# Patient Record
Sex: MALE | Race: OTHER | HISPANIC OR LATINO | URBAN - METROPOLITAN AREA
[De-identification: names, ages, dates, MRNs, and addresses within clinical notes are randomized per-mention and may not be internally consistent; named-entity substitution may affect disease eponyms.]

---

## 2018-12-15 ENCOUNTER — EMERGENCY (EMERGENCY)
Facility: HOSPITAL | Age: 23
LOS: 1 days | Discharge: HOME | End: 2018-12-15
Attending: EMERGENCY MEDICINE | Admitting: EMERGENCY MEDICINE
Payer: COMMERCIAL

## 2018-12-15 VITALS
SYSTOLIC BLOOD PRESSURE: 118 MMHG | HEART RATE: 118 BPM | DIASTOLIC BLOOD PRESSURE: 60 MMHG | RESPIRATION RATE: 20 BRPM | OXYGEN SATURATION: 97 %

## 2018-12-15 PROCEDURE — 99283 EMERGENCY DEPT VISIT LOW MDM: CPT | Mod: 25

## 2018-12-15 NOTE — ED ADULT TRIAGE NOTE - CHIEF COMPLAINT QUOTE
pt brought by mom to triage c/o N/V/D x 7 hours associated with abdm pain, states went to drive in movie theater and ate chicken fingers, has been throwing up since, yellow emesis noted on presentation

## 2018-12-16 ENCOUNTER — EMERGENCY (EMERGENCY)
Facility: HOSPITAL | Age: 23
LOS: 1 days | Discharge: HOME | End: 2018-12-16
Attending: EMERGENCY MEDICINE | Admitting: EMERGENCY MEDICINE
Payer: COMMERCIAL

## 2018-12-16 VITALS
RESPIRATION RATE: 18 BRPM | SYSTOLIC BLOOD PRESSURE: 137 MMHG | TEMPERATURE: 99 F | OXYGEN SATURATION: 100 % | DIASTOLIC BLOOD PRESSURE: 77 MMHG | HEART RATE: 96 BPM

## 2018-12-16 VITALS
HEART RATE: 80 BPM | DIASTOLIC BLOOD PRESSURE: 70 MMHG | SYSTOLIC BLOOD PRESSURE: 124 MMHG | OXYGEN SATURATION: 98 % | RESPIRATION RATE: 18 BRPM

## 2018-12-16 VITALS
HEART RATE: 101 BPM | SYSTOLIC BLOOD PRESSURE: 150 MMHG | OXYGEN SATURATION: 100 % | TEMPERATURE: 98 F | RESPIRATION RATE: 18 BRPM | DIASTOLIC BLOOD PRESSURE: 74 MMHG

## 2018-12-16 LAB
ALBUMIN SERPL ELPH-MCNC: 4 G/DL — SIGNIFICANT CHANGE UP (ref 3.3–5)
ALBUMIN SERPL ELPH-MCNC: 4.6 G/DL — SIGNIFICANT CHANGE UP (ref 3.3–5)
ALP SERPL-CCNC: 61 U/L — SIGNIFICANT CHANGE UP (ref 40–120)
ALP SERPL-CCNC: 75 U/L — SIGNIFICANT CHANGE UP (ref 40–120)
ALT FLD-CCNC: 18 U/L — SIGNIFICANT CHANGE UP (ref 4–41)
ALT FLD-CCNC: 21 U/L — SIGNIFICANT CHANGE UP (ref 4–41)
APPEARANCE UR: CLEAR — SIGNIFICANT CHANGE UP
AST SERPL-CCNC: 18 U/L — SIGNIFICANT CHANGE UP (ref 4–40)
AST SERPL-CCNC: 19 U/L — SIGNIFICANT CHANGE UP (ref 4–40)
BACTERIA # UR AUTO: NEGATIVE — SIGNIFICANT CHANGE UP
BASE EXCESS BLDV CALC-SCNC: 2.3 MMOL/L — SIGNIFICANT CHANGE UP
BASOPHILS # BLD AUTO: 0.02 K/UL — SIGNIFICANT CHANGE UP (ref 0–0.2)
BASOPHILS # BLD AUTO: 0.03 K/UL — SIGNIFICANT CHANGE UP (ref 0–0.2)
BASOPHILS NFR BLD AUTO: 0.2 % — SIGNIFICANT CHANGE UP (ref 0–2)
BASOPHILS NFR BLD AUTO: 0.4 % — SIGNIFICANT CHANGE UP (ref 0–2)
BASOPHILS NFR SPEC: 0.9 % — SIGNIFICANT CHANGE UP (ref 0–2)
BASOPHILS NFR SPEC: 0.9 % — SIGNIFICANT CHANGE UP (ref 0–2)
BILIRUB SERPL-MCNC: 0.6 MG/DL — SIGNIFICANT CHANGE UP (ref 0.2–1.2)
BILIRUB SERPL-MCNC: 0.8 MG/DL — SIGNIFICANT CHANGE UP (ref 0.2–1.2)
BILIRUB UR-MCNC: NEGATIVE — SIGNIFICANT CHANGE UP
BLASTS # FLD: 0 % — SIGNIFICANT CHANGE UP (ref 0–0)
BLOOD GAS VENOUS - CREATININE: 1.01 MG/DL — SIGNIFICANT CHANGE UP (ref 0.5–1.3)
BLOOD UR QL VISUAL: NEGATIVE — SIGNIFICANT CHANGE UP
BUN SERPL-MCNC: 18 MG/DL — SIGNIFICANT CHANGE UP (ref 7–23)
BUN SERPL-MCNC: 19 MG/DL — SIGNIFICANT CHANGE UP (ref 7–23)
CALCIUM SERPL-MCNC: 8.9 MG/DL — SIGNIFICANT CHANGE UP (ref 8.4–10.5)
CALCIUM SERPL-MCNC: 9.7 MG/DL — SIGNIFICANT CHANGE UP (ref 8.4–10.5)
CHLORIDE BLDV-SCNC: 107 MMOL/L — SIGNIFICANT CHANGE UP (ref 96–108)
CHLORIDE SERPL-SCNC: 101 MMOL/L — SIGNIFICANT CHANGE UP (ref 98–107)
CHLORIDE SERPL-SCNC: 102 MMOL/L — SIGNIFICANT CHANGE UP (ref 98–107)
CO2 SERPL-SCNC: 22 MMOL/L — SIGNIFICANT CHANGE UP (ref 22–31)
CO2 SERPL-SCNC: 26 MMOL/L — SIGNIFICANT CHANGE UP (ref 22–31)
COLOR SPEC: YELLOW — SIGNIFICANT CHANGE UP
CREAT SERPL-MCNC: 1.23 MG/DL — SIGNIFICANT CHANGE UP (ref 0.5–1.3)
CREAT SERPL-MCNC: 1.28 MG/DL — SIGNIFICANT CHANGE UP (ref 0.5–1.3)
EOSINOPHIL # BLD AUTO: 0.05 K/UL — SIGNIFICANT CHANGE UP (ref 0–0.5)
EOSINOPHIL # BLD AUTO: 0.07 K/UL — SIGNIFICANT CHANGE UP (ref 0–0.5)
EOSINOPHIL NFR BLD AUTO: 0.5 % — SIGNIFICANT CHANGE UP (ref 0–6)
EOSINOPHIL NFR BLD AUTO: 1 % — SIGNIFICANT CHANGE UP (ref 0–6)
EOSINOPHIL NFR FLD: 0.9 % — SIGNIFICANT CHANGE UP (ref 0–6)
EOSINOPHIL NFR FLD: 4.4 % — SIGNIFICANT CHANGE UP (ref 0–6)
GAS PNL BLDV: 133 MMOL/L — LOW (ref 136–146)
GIANT PLATELETS BLD QL SMEAR: PRESENT — SIGNIFICANT CHANGE UP
GLUCOSE BLDV-MCNC: 110 — HIGH (ref 70–99)
GLUCOSE SERPL-MCNC: 100 MG/DL — HIGH (ref 70–99)
GLUCOSE SERPL-MCNC: 117 MG/DL — HIGH (ref 70–99)
GLUCOSE UR-MCNC: NEGATIVE — SIGNIFICANT CHANGE UP
HCO3 BLDV-SCNC: 25 MMOL/L — SIGNIFICANT CHANGE UP (ref 20–27)
HCT VFR BLD CALC: 46 % — SIGNIFICANT CHANGE UP (ref 39–50)
HCT VFR BLD CALC: 47.1 % — SIGNIFICANT CHANGE UP (ref 39–50)
HCT VFR BLDV CALC: 45.9 % — SIGNIFICANT CHANGE UP (ref 39–51)
HGB BLD-MCNC: 14.9 G/DL — SIGNIFICANT CHANGE UP (ref 13–17)
HGB BLD-MCNC: 15.4 G/DL — SIGNIFICANT CHANGE UP (ref 13–17)
HGB BLDV-MCNC: 15 G/DL — SIGNIFICANT CHANGE UP (ref 13–17)
HYALINE CASTS # UR AUTO: NEGATIVE — SIGNIFICANT CHANGE UP
IMM GRANULOCYTES # BLD AUTO: 0.01 # — SIGNIFICANT CHANGE UP
IMM GRANULOCYTES # BLD AUTO: 0.04 # — SIGNIFICANT CHANGE UP
IMM GRANULOCYTES NFR BLD AUTO: 0.2 % — SIGNIFICANT CHANGE UP (ref 0–1.5)
IMM GRANULOCYTES NFR BLD AUTO: 0.3 % — SIGNIFICANT CHANGE UP (ref 0–1.5)
KETONES UR-MCNC: NEGATIVE — SIGNIFICANT CHANGE UP
LACTATE BLDV-MCNC: 1.8 MMOL/L — SIGNIFICANT CHANGE UP (ref 0.5–2)
LEUKOCYTE ESTERASE UR-ACNC: NEGATIVE — SIGNIFICANT CHANGE UP
LYMPHOCYTES # BLD AUTO: 0.39 K/UL — LOW (ref 1–3.3)
LYMPHOCYTES # BLD AUTO: 0.66 K/UL — LOW (ref 1–3.3)
LYMPHOCYTES # BLD AUTO: 13.7 % — SIGNIFICANT CHANGE UP (ref 13–44)
LYMPHOCYTES # BLD AUTO: 2.9 % — LOW (ref 13–44)
LYMPHOCYTES NFR SPEC AUTO: 0.9 % — LOW (ref 13–44)
LYMPHOCYTES NFR SPEC AUTO: 14 % — SIGNIFICANT CHANGE UP (ref 13–44)
MCHC RBC-ENTMCNC: 26.3 PG — LOW (ref 27–34)
MCHC RBC-ENTMCNC: 26.3 PG — LOW (ref 27–34)
MCHC RBC-ENTMCNC: 32.4 % — SIGNIFICANT CHANGE UP (ref 32–36)
MCHC RBC-ENTMCNC: 32.7 % — SIGNIFICANT CHANGE UP (ref 32–36)
MCV RBC AUTO: 80.4 FL — SIGNIFICANT CHANGE UP (ref 80–100)
MCV RBC AUTO: 81.1 FL — SIGNIFICANT CHANGE UP (ref 80–100)
METAMYELOCYTES # FLD: 0.9 % — SIGNIFICANT CHANGE UP (ref 0–1)
MICROCYTES BLD QL: SIGNIFICANT CHANGE UP
MONOCYTES # BLD AUTO: 0.52 K/UL — SIGNIFICANT CHANGE UP (ref 0–0.9)
MONOCYTES # BLD AUTO: 0.86 K/UL — SIGNIFICANT CHANGE UP (ref 0–0.9)
MONOCYTES NFR BLD AUTO: 10.8 % — SIGNIFICANT CHANGE UP (ref 2–14)
MONOCYTES NFR BLD AUTO: 6.4 % — SIGNIFICANT CHANGE UP (ref 2–14)
MONOCYTES NFR BLD: 12.3 % — HIGH (ref 2–9)
MONOCYTES NFR BLD: 6.9 % — SIGNIFICANT CHANGE UP (ref 2–9)
MORPHOLOGY BLD-IMP: NORMAL — SIGNIFICANT CHANGE UP
MYELOCYTES NFR BLD: 0 % — SIGNIFICANT CHANGE UP (ref 0–0)
NEUTROPHIL AB SER-ACNC: 51.8 % — SIGNIFICANT CHANGE UP (ref 43–77)
NEUTROPHIL AB SER-ACNC: 75.6 % — SIGNIFICANT CHANGE UP (ref 43–77)
NEUTROPHILS # BLD AUTO: 11.99 K/UL — HIGH (ref 1.8–7.4)
NEUTROPHILS # BLD AUTO: 3.55 K/UL — SIGNIFICANT CHANGE UP (ref 1.8–7.4)
NEUTROPHILS NFR BLD AUTO: 73.9 % — SIGNIFICANT CHANGE UP (ref 43–77)
NEUTROPHILS NFR BLD AUTO: 89.7 % — HIGH (ref 43–77)
NEUTS BAND # BLD: 10.5 % — HIGH (ref 0–6)
NEUTS BAND # BLD: 13 % — HIGH (ref 0–6)
NITRITE UR-MCNC: NEGATIVE — SIGNIFICANT CHANGE UP
NRBC # FLD: 0 — SIGNIFICANT CHANGE UP
NRBC # FLD: 0 — SIGNIFICANT CHANGE UP
OTHER - HEMATOLOGY %: 0 — SIGNIFICANT CHANGE UP
OVALOCYTES BLD QL SMEAR: SLIGHT — SIGNIFICANT CHANGE UP
PCO2 BLDV: 48 MMHG — SIGNIFICANT CHANGE UP (ref 41–51)
PH BLDV: 7.37 PH — SIGNIFICANT CHANGE UP (ref 7.32–7.43)
PH UR: 6 — SIGNIFICANT CHANGE UP (ref 5–8)
PLATELET # BLD AUTO: 214 K/UL — SIGNIFICANT CHANGE UP (ref 150–400)
PLATELET # BLD AUTO: 227 K/UL — SIGNIFICANT CHANGE UP (ref 150–400)
PLATELET COUNT - ESTIMATE: NORMAL — SIGNIFICANT CHANGE UP
PMV BLD: 11.7 FL — SIGNIFICANT CHANGE UP (ref 7–13)
PMV BLD: 11.7 FL — SIGNIFICANT CHANGE UP (ref 7–13)
PO2 BLDV: 37 MMHG — SIGNIFICANT CHANGE UP (ref 35–40)
POTASSIUM BLDV-SCNC: 3.5 MMOL/L — SIGNIFICANT CHANGE UP (ref 3.4–4.5)
POTASSIUM SERPL-MCNC: 3.8 MMOL/L — SIGNIFICANT CHANGE UP (ref 3.5–5.3)
POTASSIUM SERPL-MCNC: 4.5 MMOL/L — SIGNIFICANT CHANGE UP (ref 3.5–5.3)
POTASSIUM SERPL-SCNC: 3.8 MMOL/L — SIGNIFICANT CHANGE UP (ref 3.5–5.3)
POTASSIUM SERPL-SCNC: 4.5 MMOL/L — SIGNIFICANT CHANGE UP (ref 3.5–5.3)
PROMYELOCYTES # FLD: 0 % — SIGNIFICANT CHANGE UP (ref 0–0)
PROT SERPL-MCNC: 7.5 G/DL — SIGNIFICANT CHANGE UP (ref 6–8.3)
PROT SERPL-MCNC: 8 G/DL — SIGNIFICANT CHANGE UP (ref 6–8.3)
PROT UR-MCNC: 20 — SIGNIFICANT CHANGE UP
RBC # BLD: 5.67 M/UL — SIGNIFICANT CHANGE UP (ref 4.2–5.8)
RBC # BLD: 5.86 M/UL — HIGH (ref 4.2–5.8)
RBC # FLD: 12.3 % — SIGNIFICANT CHANGE UP (ref 10.3–14.5)
RBC # FLD: 12.3 % — SIGNIFICANT CHANGE UP (ref 10.3–14.5)
RBC CASTS # UR COMP ASSIST: SIGNIFICANT CHANGE UP (ref 0–?)
SAO2 % BLDV: 66.2 % — SIGNIFICANT CHANGE UP (ref 60–85)
SMUDGE CELLS # BLD: PRESENT — SIGNIFICANT CHANGE UP
SODIUM SERPL-SCNC: 138 MMOL/L — SIGNIFICANT CHANGE UP (ref 135–145)
SODIUM SERPL-SCNC: 139 MMOL/L — SIGNIFICANT CHANGE UP (ref 135–145)
SP GR SPEC: 1.03 — SIGNIFICANT CHANGE UP (ref 1–1.04)
SQUAMOUS # UR AUTO: SIGNIFICANT CHANGE UP
UROBILINOGEN FLD QL: NORMAL — SIGNIFICANT CHANGE UP
VARIANT LYMPHS # BLD: 0.9 % — SIGNIFICANT CHANGE UP
VARIANT LYMPHS # FLD: 6.1 % — SIGNIFICANT CHANGE UP
WBC # BLD: 13.38 K/UL — HIGH (ref 3.8–10.5)
WBC # BLD: 4.81 K/UL — SIGNIFICANT CHANGE UP (ref 3.8–10.5)
WBC # FLD AUTO: 13.38 K/UL — HIGH (ref 3.8–10.5)
WBC # FLD AUTO: 4.81 K/UL — SIGNIFICANT CHANGE UP (ref 3.8–10.5)
WBC UR QL: SIGNIFICANT CHANGE UP (ref 0–?)

## 2018-12-16 PROCEDURE — 71046 X-RAY EXAM CHEST 2 VIEWS: CPT | Mod: 26

## 2018-12-16 PROCEDURE — 99283 EMERGENCY DEPT VISIT LOW MDM: CPT

## 2018-12-16 RX ORDER — FAMOTIDINE 10 MG/ML
20 INJECTION INTRAVENOUS DAILY
Qty: 0 | Refills: 0 | Status: DISCONTINUED | OUTPATIENT
Start: 2018-12-16 | End: 2018-12-19

## 2018-12-16 RX ORDER — SODIUM CHLORIDE 9 MG/ML
1000 INJECTION INTRAMUSCULAR; INTRAVENOUS; SUBCUTANEOUS ONCE
Qty: 0 | Refills: 0 | Status: COMPLETED | OUTPATIENT
Start: 2018-12-16 | End: 2018-12-16

## 2018-12-16 RX ORDER — ONDANSETRON 8 MG/1
4 TABLET, FILM COATED ORAL ONCE
Qty: 0 | Refills: 0 | Status: COMPLETED | OUTPATIENT
Start: 2018-12-16 | End: 2018-12-16

## 2018-12-16 RX ADMIN — ONDANSETRON 4 MILLIGRAM(S): 8 TABLET, FILM COATED ORAL at 00:27

## 2018-12-16 RX ADMIN — Medication 30 MILLILITER(S): at 02:50

## 2018-12-16 RX ADMIN — FAMOTIDINE 20 MILLIGRAM(S): 10 INJECTION INTRAVENOUS at 02:50

## 2018-12-16 RX ADMIN — SODIUM CHLORIDE 1000 MILLILITER(S): 9 INJECTION INTRAMUSCULAR; INTRAVENOUS; SUBCUTANEOUS at 14:31

## 2018-12-16 RX ADMIN — SODIUM CHLORIDE 1000 MILLILITER(S): 9 INJECTION INTRAMUSCULAR; INTRAVENOUS; SUBCUTANEOUS at 00:27

## 2018-12-16 NOTE — ED ADULT NURSE NOTE - OBJECTIVE STATEMENT
Zhou RN: Patient received in Trauma A c/o N/V/D and abdominal pain s/p eating chicken fingers today. Patient A&OX3, reports Dad also ate the same food and is feeling nauseas. Patient denies any PMHx or blood in stool/emesis. VS as noted. Report given to primary RN Chen. Will monitor.

## 2018-12-16 NOTE — ED PROVIDER NOTE - OBJECTIVE STATEMENT
24 y/o healthy M with 1 day of N/V/D.  Pt reports multiple episodes (>7) of nbnb vomiting and nonbloody soft diarrhea.  Pt reports he was at a dine-in movie theater with his family this afternoon prior to symptom onset.  He and his father had chicken fingers, and both have similar sxs.  No fever, cp, sob, back pain, urinary sxs.  No recent travel or abx use/hospitalizations.  Pt reports "tightening" feeling in his abdomen, which worsens when vomiting, but no other pain.

## 2018-12-16 NOTE — ED PROVIDER NOTE - CARE PLAN
Principal Discharge DX:	Gastroenteritis  Assessment and plan of treatment:	Advance activity as tolerated.  Continue all previously prescribed medications as directed unless otherwise instructed.  Take Zofran 4 mg ODT every 8 hours as needed for nausea and vomiting. Follow up with your primary care physician in 48-72 hours- bring copies of your results.  Return to the ER for worsening or persistent symptoms, including but not limited to worsening/persistent fevers, vomiting, diarrhea, bloody stools, abdominal pain, and/or ANY NEW OR CONCERNING SYMPTOMS. If you have issues obtaining follow up, please call: [phone] to obtain a doctor or specialist who takes your insurance in your area.  You may call [phone] to make an appointment with the internal medicine clinic.

## 2018-12-16 NOTE — ED PROVIDER NOTE - NSFOLLOWUPINSTRUCTIONS_ED_ALL_ED_FT
Drink plenty of fluids.  You can take over-the-counter stomach medication as needed for pain and nausea (Pepto Bismol, Pepcid, Maalox, etc.)  Take as directed.  Follow-up with your PMD.  Return to the emergency department with any new or worsening symptoms.

## 2018-12-16 NOTE — ED PROVIDER NOTE - PROGRESS NOTE DETAILS
SOFIA PATTON:  UA and CXR negative for acute pathology. Bandemia improved.  Pt very well appearing.  Pt tolerating PO.  Pt medically stable for discharge. Pt to follow up with PMD. Strict return precautions given.

## 2018-12-16 NOTE — ED POST DISCHARGE NOTE - DETAILS
Attempted to contact patient to return for repeat labs and evaluation.  No answer at home number and message left on cell phone. Attempted to contact patient, no answer at listed number. Info left for admin PA for call back.

## 2018-12-16 NOTE — ED PROVIDER NOTE - PLAN OF CARE
Advance activity as tolerated.  Continue all previously prescribed medications as directed unless otherwise instructed.  Take Zofran 4 mg ODT every 8 hours as needed for nausea and vomiting. Follow up with your primary care physician in 48-72 hours- bring copies of your results.  Return to the ER for worsening or persistent symptoms, including but not limited to worsening/persistent fevers, vomiting, diarrhea, bloody stools, abdominal pain, and/or ANY NEW OR CONCERNING SYMPTOMS. If you have issues obtaining follow up, please call: [phone] to obtain a doctor or specialist who takes your insurance in your area.  You may call [phone] to make an appointment with the internal medicine clinic.

## 2018-12-16 NOTE — ED ADULT NURSE NOTE - NSIMPLEMENTINTERV_GEN_ALL_ED
Implemented All Universal Safety Interventions:  [place] to call system. Call bell, personal items and telephone within reach. Instruct patient to call for assistance. Room bathroom lighting operational. Non-slip footwear when patient is off stretcher. Physically safe environment: no spills, clutter or unnecessary equipment. Stretcher in lowest position, wheels locked, appropriate side rails in place.

## 2018-12-16 NOTE — ED PROVIDER NOTE - MEDICAL DECISION MAKING DETAILS
pt here for follow up of abnormal lab  Pts sxs improved  still with elevated band (percent decreased )  WBC nl  overall feel acute illness has passed

## 2018-12-16 NOTE — ED PROVIDER NOTE - OBJECTIVE STATEMENT
pt recalled for elevated band count  seen yesterday for acute GI sxs  likely due to food bourne pathogen   Had fever  vomtiing watery diarrhea  occurred about 2 hrs after ingestion (relative had same food with similar though less severe sxs)    Pt reports  no further vomiting  diarrhea resolving (less frequent more formed stools)   no abd pain  cough or urinary sxs

## 2018-12-16 NOTE — ED ADULT TRIAGE NOTE - CHIEF COMPLAINT QUOTE
pt is a call back states I was seen here yesterday for n,v ,d I received phone call today telling me to return because I have infection in my blood/

## 2018-12-16 NOTE — ED PROVIDER NOTE - ATTENDING CONTRIBUTION TO CARE
[place]  pt recalled due to elevated band count drawn yesterday  Pt here yesterday with acute GI illness  Occurred about 2 hrs after eating out  (one relative also became sick after eating at the same place)  Pt denies frequent GI illnesses or recent travel  Yesterday  had recurrent vomiting  fever and watery diarrhea   Pt no longer nauseous, stools are more formed  denies fever or abd pain    exam  well appearing  no distress  clear lungs  abd nontender  pt  ambulatory  in ED w/o sxs    Imp  resolving acute GI illness ?staph by timing

## 2018-12-16 NOTE — ED PROVIDER NOTE - NSFOLLOWUPINSTRUCTIONS_ED_ALL_ED_FT
Advance activity as tolerated.  Continue all previously prescribed medications as directed unless otherwise instructed.  Take Zofran 4 mg ODT every 8 hours as needed for nausea and vomiting. Follow up with your primary care physician in 48-72 hours- bring copies of your results.  Return to the ER for worsening or persistent symptoms, including but not limited to worsening/persistent fevers, vomiting, diarrhea, bloody stools, abdominal pain, and/or ANY NEW OR CONCERNING SYMPTOMS. If you have issues obtaining follow up, please call: [phone] to obtain a doctor or specialist who takes your insurance in your area.  You may call [phone] to make an appointment with the internal medicine clinic.

## 2018-12-16 NOTE — ED PROVIDER NOTE - MEDICAL DECISION MAKING DETAILS
22 y/o with N/V/D x 1 day.  Nontoxic appearing, benign abd exam, but mildly dry on exam.  Will hydrate, check labs, antiemetics, reassess.  Likely viral age vs food poisoning given exposure and others with similar sxs.

## 2018-12-17 LAB
GI PCR PANEL, STOOL: SIGNIFICANT CHANGE UP
SPECIMEN SOURCE: SIGNIFICANT CHANGE UP

## 2018-12-17 NOTE — ED POST DISCHARGE NOTE - DETAILS
Patient informed of stool PCR results. His diarrhea, vomiting, and nausea have fully resolved. He was councelled to avoid food preparation for others for 48 hours and to wash hands carefully with soap and water after using restroom. Patient informed that alcohol-based disinfectants are insufficient to prevent transmission of this virus. He was told to return for any persistent symptoms, to follow up with his primary doctor, and to continue drinking lots of fluids. He agrees to all recommendation

## 2018-12-18 LAB
BACTERIA UR CULT: SIGNIFICANT CHANGE UP
SPECIMEN SOURCE: SIGNIFICANT CHANGE UP

## 2018-12-19 LAB — BACTERIA STL CULT: SIGNIFICANT CHANGE UP

## 2018-12-21 LAB
BACTERIA BLD CULT: SIGNIFICANT CHANGE UP
BACTERIA BLD CULT: SIGNIFICANT CHANGE UP

## 2021-06-13 ENCOUNTER — EMERGENCY (EMERGENCY)
Facility: HOSPITAL | Age: 26
LOS: 1 days | Discharge: HOME | End: 2021-06-13
Admitting: EMERGENCY MEDICINE
Payer: COMMERCIAL

## 2021-06-13 VITALS
DIASTOLIC BLOOD PRESSURE: 71 MMHG | RESPIRATION RATE: 16 BRPM | OXYGEN SATURATION: 100 % | TEMPERATURE: 98 F | HEART RATE: 61 BPM | SYSTOLIC BLOOD PRESSURE: 135 MMHG

## 2021-06-13 PROCEDURE — 10060 I&D ABSCESS SIMPLE/SINGLE: CPT

## 2021-06-13 PROCEDURE — 99283 EMERGENCY DEPT VISIT LOW MDM: CPT | Mod: 25

## 2021-06-13 NOTE — ED ADULT TRIAGE NOTE - CHIEF COMPLAINT QUOTE
pt has lump on side of face near neck, for past 3 days and its getting bigger/ no redness  noted no pain

## 2021-06-13 NOTE — ED PROVIDER NOTE - OBJECTIVE STATEMENT
patient is a 25 y/o M with no sig pmhx presenting with c/o mass to the left side of neck x 4 days. He states mass is painless, with no assoc fever, chills, sore throat. He denies injuries, trauma, shaving, sick contact prior to the onset of symptoms. He reports having a history of ance, some of which have developed into abscess.

## 2021-06-13 NOTE — ED PROVIDER NOTE - NSFOLLOWUPINSTRUCTIONS_ED_ALL_ED_FT
You were prescribed clindamycin. please take antibiotic three times a day for 7 days.   Abscess    An abscess is an infected area that contains a collection of pus and debris. It can occur in almost any part of the body and occurs when the tissue gets infection. Symptoms include a painful mass that is red, warm, tender that might break open and HAVE drainage. If your health care provider gave you antibiotics make sure to take the full course and do not stop even if feeling better.     SEEK IMMEDIATE MEDICAL CARE IF YOU HAVE ANY OF THE FOLLOWING SYMPTOMS: chills, fever, muscle aches, or red streaking from the area.

## 2021-06-13 NOTE — ED PROVIDER NOTE - PATIENT PORTAL LINK FT
You can access the FollowMyHealth Patient Portal offered by [facility] by registering at the following website: [email or web address]. By joining [email or web address] FollowMyHealth portal, you will also be able to view your health information using other applications (apps) compatible with our system.

## 2021-08-11 NOTE — ED PROVIDER NOTE - NS ED MD EM SELECTION
22491 Exp Problem Focused - Mod. Complex
[FreeTextEntry1] : 1. see pan\par 2. DM\par Hb A1C\par continue Trulicity \par 3. Hld\par cont Atorvastatin 10 mg QHS\par will monitor lipids/ LFT

## 2025-01-31 ENCOUNTER — OUTPATIENT (OUTPATIENT)
Dept: OUTPATIENT SERVICES | Facility: HOSPITAL | Age: 30
LOS: 1 days | End: 2025-01-31

## 2025-01-31 VITALS
TEMPERATURE: 97 F | WEIGHT: 220.02 LBS | RESPIRATION RATE: 18 BRPM | DIASTOLIC BLOOD PRESSURE: 86 MMHG | HEART RATE: 79 BPM | SYSTOLIC BLOOD PRESSURE: 131 MMHG | HEIGHT: 75 IN | OXYGEN SATURATION: 99 %

## 2025-01-31 DIAGNOSIS — L05.91 PILONIDAL CYST WITHOUT ABSCESS: ICD-10-CM

## 2025-01-31 DIAGNOSIS — Z87.2 PERSONAL HISTORY OF DISEASES OF THE SKIN AND SUBCUTANEOUS TISSUE: Chronic | ICD-10-CM

## 2025-01-31 NOTE — H&P PST ADULT - NSANTHOSAYNRD_GEN_A_CORE
No. RUSLAN screening performed.  STOP BANG Legend: 0-2 = LOW Risk; 3-4 = INTERMEDIATE Risk; 5-8 = HIGH Risk
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
## 2025-01-31 NOTE — H&P PST ADULT - NEGATIVE ENMT SYMPTOMS
Denies dentures. Denies loose teeth./no hearing difficulty/no ear pain/no tinnitus/no vertigo/no sinus symptoms/no nasal congestion/no nasal discharge/no nasal obstruction/no post-nasal discharge/no nose bleeds/no recurrent cold sores/no dry mouth/no dysphagia

## 2025-01-31 NOTE — H&P PST ADULT - PROBLEM SELECTOR PLAN 1
Patient tentatively scheduled for Excision of scrotal cyst on 2/3/25.  Pre-op instructions provided. Pt given verbal and written instructions with teach back on pepcid. Pt verbalized understanding.

## 2025-01-31 NOTE — H&P PST ADULT - HISTORY OF PRESENT ILLNESS
29 year old male with pmhx of  Pilonidal cyst on sacrum s/p excision (January 2024), presents for pre-op evaluation for Pilonidal cyst without abscess. Per patient,  left scrotal cyst started 1 year ago and kept on recurring.  Patient was treated with antibiotics before without relief.  He is currently on Cefdinir. Denies scrotal pain, swelling, fevers, cp, sob, [place], palpitations, n/v/c/d.

## 2025-01-31 NOTE — H&P PST ADULT - GASTROINTESTINAL
details… normal/soft/nontender/nondistended/normal active bowel sounds/no guarding/no rigidity/no palpable mary

## 2025-01-31 NOTE — H&P PST ADULT - ATTENDING COMMENTS
Scrotal wall pilonidal cyst. OR today for excision. R/B/A reviewed. On cefdinir at home 72 hours. Consent reviewed and signed.

## 2025-02-03 ENCOUNTER — OUTPATIENT (OUTPATIENT)
Dept: INPATIENT UNIT | Facility: HOSPITAL | Age: 30
LOS: 1 days | Discharge: HOME | End: 2025-02-03
Payer: COMMERCIAL

## 2025-02-03 VITALS
RESPIRATION RATE: 12 BRPM | HEART RATE: 66 BPM | DIASTOLIC BLOOD PRESSURE: 73 MMHG | OXYGEN SATURATION: 100 % | SYSTOLIC BLOOD PRESSURE: 120 MMHG

## 2025-02-03 VITALS
TEMPERATURE: 98 F | HEIGHT: 75 IN | DIASTOLIC BLOOD PRESSURE: 82 MMHG | HEART RATE: 70 BPM | RESPIRATION RATE: 16 BRPM | OXYGEN SATURATION: 100 % | SYSTOLIC BLOOD PRESSURE: 132 MMHG | WEIGHT: 220.02 LBS

## 2025-02-03 DIAGNOSIS — Z87.2 PERSONAL HISTORY OF DISEASES OF THE SKIN AND SUBCUTANEOUS TISSUE: Chronic | ICD-10-CM

## 2025-02-03 DIAGNOSIS — L05.91 PILONIDAL CYST WITHOUT ABSCESS: ICD-10-CM

## 2025-02-03 PROCEDURE — 88305 TISSUE EXAM BY PATHOLOGIST: CPT | Mod: 26

## 2025-02-03 RX ORDER — SODIUM CHLORIDE 9 G/ML
1000 INJECTION, SOLUTION INTRAVENOUS
Refills: 0 | Status: DISCONTINUED | OUTPATIENT
Start: 2025-02-03 | End: 2025-02-03

## 2025-02-03 RX ORDER — FENTANYL CITRATE 50 UG/ML
25 INJECTION INTRAMUSCULAR; INTRAVENOUS
Refills: 0 | Status: DISCONTINUED | OUTPATIENT
Start: 2025-02-03 | End: 2025-02-03

## 2025-02-03 RX ORDER — SODIUM CHLORIDE 9 G/ML
1000 INJECTION, SOLUTION INTRAVENOUS
Refills: 0 | Status: DISCONTINUED | OUTPATIENT
Start: 2025-02-03 | End: 2025-02-17

## 2025-02-03 RX ORDER — CEFDINIR 125 MG/5ML
1 POWDER, FOR SUSPENSION ORAL
Refills: 0 | DISCHARGE

## 2025-02-03 RX ORDER — HYDROMORPHONE HYDROCHLORIDE 4 MG/ML
0.5 INJECTION, SOLUTION INTRAMUSCULAR; INTRAVENOUS; SUBCUTANEOUS
Refills: 0 | Status: DISCONTINUED | OUTPATIENT
Start: 2025-02-03 | End: 2025-02-03

## 2025-02-03 NOTE — ASU DISCHARGE PLAN (ADULT/PEDIATRIC) - CARE PROVIDER_API CALL
Jhonathan Asher  Urology  [address], [address]  [place] [address]  Phone: [phone]  Fax: [phone]  Follow Up Time: 2 weeks

## 2025-02-03 NOTE — ASU DISCHARGE PLAN (ADULT/PEDIATRIC) - NURSING INSTRUCTIONS
You received IV Tylenol for pain management at 8:00AM. Please DO NOT take any Tylenol (Acetaminophen) containing products, such as Vicodin, Percocet, Excedrin, and cold medications for the next 6 hours (until 2:00PM). DO NOT TAKE MORE THAN 4000 MG OF TYLENOL in a 24 hour period. Alternate Tylenol AND Motrin EVERY 3 hours making sure that each dose of Tylenol is 6 hours from previous tylenol dose and each dose of motrin is 6 hours from previous motrin dose.

## 2025-02-03 NOTE — ASU DISCHARGE PLAN (ADULT/PEDIATRIC) - ACTIVITY LEVEL
Instructions: This plan will send the code FBSE to the PM system.  DO NOT or CHANGE the price. Price (Do Not Change): 0.00 Detail Level: Simple No heavy lifting

## 2025-02-03 NOTE — ASU PREOP CHECKLIST - TYPE OF SOLUTION
24 hour Holter monitor only. Verified patient with two patient identifiers. Pt verbalized understanding of its use. Ordering MD- Dr. Isabella Murillo  Reason: atrial fib  Start time: 4:27pm  Return date: 5/30/18        No LOS. lr

## 2025-02-03 NOTE — ASU DISCHARGE PLAN (ADULT/PEDIATRIC) - FINANCIAL ASSISTANCE
[facility] provides services at a reduced cost to those who are determined to be eligible through [facility]’s financial assistance program. Information regarding [facility]’s financial assistance program can be found by going to [email or web address] or by calling [phone].

## 2025-02-07 LAB — SURGICAL PATHOLOGY STUDY: SIGNIFICANT CHANGE UP

## 2025-05-13 NOTE — ED PROCEDURE NOTE - EBL
ORDER FOR GI PROCEDURE   Incoming Fax:5/13/25  DX: Colon cancer screening  HIV disease  Encounter for long-term (current) use of high-risk medication  Procedure: Colonoscopy  Ordering provider: Ivy Gonzales,   Phone:[phone]  Fax: [phone]     minimal

## (undated) DEVICE — BAR ROLLER FOR ELITE ELCTR

## (undated) DEVICE — GLV 8 PROTEXIS (CREAM) MICRO

## (undated) DEVICE — SYR TOOMEY 50ML

## (undated) DEVICE — SOL IRR BAG H2O 3000ML

## (undated) DEVICE — PACK CYSTO

## (undated) DEVICE — VENODYNE/SCD SLEEVE CALF MEDIUM

## (undated) DEVICE — TUBING LEVEL ONE NORMOFLO SET

## (undated) DEVICE — TUBING SET TUR BLADDER IRRIGATION Y-TYPE 81"

## (undated) DEVICE — BAG URINE W METER 2L

## (undated) DEVICE — CABLE DAC ACTIVE CORD

## (undated) DEVICE — WARMING BLANKET UPPER ADULT

## (undated) DEVICE — LIJ/LIA-ADAPTER LCKNG ELLIK EVACUATING: Type: DURABLE MEDICAL EQUIPMENT

## (undated) DEVICE — TUBING THERMADX UROLOGY

## (undated) DEVICE — SOL IRR POUR H2O 1500ML

## (undated) DEVICE — GLV 7.5 PROTEXIS (CREAM) MICRO

## (undated) DEVICE — CANISTER DISPOSABLE THIN WALL 3000CC

## (undated) DEVICE — ELCTR GROUNDING PAD ADULT COVIDIEN

## (undated) DEVICE — POSITIONER STRAP ARMBOARD VELCRO TS-30